# Patient Record
Sex: MALE | Race: WHITE | NOT HISPANIC OR LATINO | URBAN - METROPOLITAN AREA
[De-identification: names, ages, dates, MRNs, and addresses within clinical notes are randomized per-mention and may not be internally consistent; named-entity substitution may affect disease eponyms.]

---

## 2023-06-09 PROBLEM — J45.20 MILD INTERMITTENT ASTHMA (HHS-HCC): Status: ACTIVE | Noted: 2023-06-09

## 2023-06-09 PROBLEM — R05.9 COUGH: Status: ACTIVE | Noted: 2023-06-09

## 2023-06-09 PROBLEM — J30.81 ALLERGIC RHINITIS DUE TO ANIMAL DANDER: Status: ACTIVE | Noted: 2023-06-09

## 2023-06-09 PROBLEM — L90.6 STRIAE: Status: ACTIVE | Noted: 2023-06-09

## 2023-06-09 PROBLEM — J30.1 ALLERGIC RHINITIS DUE TO POLLEN: Status: ACTIVE | Noted: 2023-06-09

## 2023-06-09 PROBLEM — Z11.52 ENCOUNTER FOR SCREENING FOR COVID-19: Status: ACTIVE | Noted: 2023-06-09

## 2023-06-09 PROBLEM — J30.9 ALLERGIC RHINITIS: Status: ACTIVE | Noted: 2023-06-09

## 2023-06-09 PROBLEM — H10.10 CONJUNCTIVITIS, ALLERGIC: Status: ACTIVE | Noted: 2023-06-09

## 2023-06-09 PROBLEM — H10.9 CONJUNCTIVITIS: Status: ACTIVE | Noted: 2023-06-09

## 2023-06-09 PROBLEM — J45.909 ASTHMA (HHS-HCC): Status: ACTIVE | Noted: 2023-06-09

## 2023-06-09 PROBLEM — J32.9 RECURRENT SINUS INFECTIONS: Status: ACTIVE | Noted: 2023-06-09

## 2023-06-09 PROBLEM — L60.9 NAIL ABNORMALITIES: Status: ACTIVE | Noted: 2023-06-09

## 2023-06-09 PROBLEM — R29.3 POOR POSTURE: Status: ACTIVE | Noted: 2023-06-09

## 2023-06-09 PROBLEM — R00.2 HEART PALPITATIONS: Status: ACTIVE | Noted: 2023-06-09

## 2023-06-09 PROBLEM — R09.81 NASAL CONGESTION: Status: ACTIVE | Noted: 2023-06-09

## 2023-06-09 PROBLEM — R53.83 FATIGUE: Status: ACTIVE | Noted: 2023-06-09

## 2023-06-09 PROBLEM — N20.0 NEPHROLITHIASIS: Status: ACTIVE | Noted: 2023-06-09

## 2023-06-09 PROBLEM — D80.6: Status: ACTIVE | Noted: 2023-06-09

## 2023-06-09 RX ORDER — AZELASTINE HYDROCHLORIDE, FLUTICASONE PROPIONATE 137; 50 UG/1; UG/1
SPRAY, METERED NASAL EVERY 12 HOURS
COMMUNITY
Start: 2022-08-15

## 2023-06-09 RX ORDER — EPINEPHRINE 0.3 MG/.3ML
0.3 INJECTION SUBCUTANEOUS
COMMUNITY
Start: 2022-09-13

## 2023-06-09 RX ORDER — LORATADINE 10 MG/1
10 TABLET ORAL DAILY
COMMUNITY

## 2023-06-09 RX ORDER — OLOPATADINE HYDROCHLORIDE 2 MG/ML
SOLUTION/ DROPS OPHTHALMIC
COMMUNITY
Start: 2022-08-17

## 2023-06-09 RX ORDER — MINERAL OIL
ENEMA (ML) RECTAL
COMMUNITY
Start: 2022-08-17

## 2023-06-09 RX ORDER — LEVALBUTEROL TARTRATE 45 UG/1
AEROSOL, METERED ORAL
COMMUNITY
Start: 2014-08-26

## 2023-06-14 ENCOUNTER — APPOINTMENT (OUTPATIENT)
Dept: PEDIATRICS | Facility: CLINIC | Age: 18
End: 2023-06-14
Payer: COMMERCIAL

## 2023-08-04 RX ORDER — AMOXICILLIN AND CLAVULANATE POTASSIUM 875; 125 MG/1; MG/1
1 TABLET, FILM COATED ORAL EVERY 12 HOURS
COMMUNITY
Start: 2023-02-02

## 2023-08-04 RX ORDER — AMOXICILLIN 500 MG/1
CAPSULE ORAL
COMMUNITY
Start: 2023-04-03

## 2023-08-04 RX ORDER — AZELASTINE 1 MG/ML
SPRAY, METERED NASAL
COMMUNITY
Start: 2022-08-12

## 2023-08-10 ENCOUNTER — APPOINTMENT (OUTPATIENT)
Dept: PEDIATRICS | Facility: CLINIC | Age: 18
End: 2023-08-10
Payer: COMMERCIAL

## 2023-11-09 ENCOUNTER — TELEPHONE (OUTPATIENT)
Dept: ALLERGY | Facility: HOSPITAL | Age: 18
End: 2023-11-09
Payer: COMMERCIAL

## 2023-11-09 NOTE — TELEPHONE ENCOUNTER
Allergy clinic called to say vials were  and they need to dilute down, however they are not allowed to dilute the vials. Then called Uche who is coming home for thanksgiving break soon. The plan is for uche to bring his vials to his next appt with us, we will dilute down, administer the next dose and then send the vials back with him to continue his immunotherapy shots at school. Called his allergy clinic at school with details of this plan and left a call back number.

## 2023-11-22 ENCOUNTER — CLINICAL SUPPORT (OUTPATIENT)
Dept: ALLERGY | Facility: HOSPITAL | Age: 18
End: 2023-11-22
Payer: COMMERCIAL

## 2023-11-22 VITALS — TEMPERATURE: 97.6 F | SYSTOLIC BLOOD PRESSURE: 112 MMHG | DIASTOLIC BLOOD PRESSURE: 77 MMHG | HEART RATE: 64 BPM

## 2023-11-22 DIAGNOSIS — J30.81 ALLERGIC RHINITIS DUE TO ANIMAL DANDER: ICD-10-CM

## 2023-11-22 DIAGNOSIS — J30.1 SEASONAL ALLERGIC RHINITIS DUE TO POLLEN: ICD-10-CM

## 2023-11-22 DIAGNOSIS — H10.13 ALLERGIC CONJUNCTIVITIS OF BOTH EYES: ICD-10-CM

## 2023-11-22 PROCEDURE — 95117 IMMUNOTHERAPY INJECTIONS: CPT | Performed by: STUDENT IN AN ORGANIZED HEALTH CARE EDUCATION/TRAINING PROGRAM

## 2023-11-22 NOTE — PROGRESS NOTES
Jose Maria is here today for his regularly scheduled immunotherapy injection. Patient backed down to 0.35ml of 1:10 vials per protocol. Patient denies recent illness, delayed reaction after last injection, or asthma exacerbation. Patient reports taking antihistimine before today's visit. Lungs clear and equal bilaterally. Patient received his injection as recorded in flowsheet. Patient monitored for 30 minutes after injection and left the office at baseline state of health. Will continue allergy immunotherapy as planned. Patient instructed to call RN line if new symptoms or reaction from today's injection are noted.

## 2023-11-24 ENCOUNTER — OFFICE VISIT (OUTPATIENT)
Dept: PEDIATRICS | Facility: CLINIC | Age: 18
End: 2023-11-24
Payer: COMMERCIAL

## 2023-11-24 VITALS
HEART RATE: 71 BPM | DIASTOLIC BLOOD PRESSURE: 74 MMHG | SYSTOLIC BLOOD PRESSURE: 114 MMHG | WEIGHT: 153 LBS | BODY MASS INDEX: 20.28 KG/M2 | HEIGHT: 73 IN

## 2023-11-24 DIAGNOSIS — Z00.00 WELL ADULT HEALTH CHECK: Primary | ICD-10-CM

## 2023-11-24 PROCEDURE — 99395 PREV VISIT EST AGE 18-39: CPT | Performed by: PEDIATRICS

## 2023-11-24 NOTE — PROGRESS NOTES
"The patient is here alone today for routine health maintenance.   General Health: Overall in good health  Concerns today:   Significant PMHx: ALLERGIES-- SAW ALLERGIST AND IS GETTING SHOTS NOW.   Interim Hx:     Nutrition: COOKS FOR HIMSELF  Dental Care: Has a dental home. Performs regular dental hygiene.  Sleep: 8 HRS/ NIGHT.   Behavior/ Socialization: Appropriate peer relationships. Parent-child-sibling interactions are normal. Has supportive adult relationship(s). [facility] [employer] [employer].   Developmental: Does not receive educational accommodations. Social interaction is age-appropriate.   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  Work: WORKS AT [employer]  Activities: PLAYS [employer] ULTIMATE [employer]  Risk Assessment: Teen questionnaire completed and did not flag as abnormal.  Sex: DENIES  Drugs/Alcohol Use: OCC ETOH, WEED  Mental Health Assessment: Screening questionnaire for depression negative. Does not endorse thoughts of suicide or self-harm.  Safety: Uses safety belts in cars.     ROS:  Denies headaches, dizziness, syncope/ near syncope, stuffy/runny nose, sneezing, sore throat, coughing, chest pain, abnormal heart rate, abdominal pain, N/V/D, rashes, pain in extremities.      /74   Pulse 71   Ht 1.854 m (6' 1\")   Wt 69.4 kg (153 lb)   BMI 20.19 kg/m²   Growth percentiles: Facility age limit for growth %shannon is 20 years. Facility age limit for growth %shannon is 20 years.   Constitutional: Well-developed, well nourished, adequately hydrated. No acute distress.   Head/face: Normocephalic, atraumatic.  Eyes: Conjunctivae, sclerae, and lids WNL bilaterally. PERRL. EOMI.  ENT: No nasal discharge, TMs with normal color, landmarks, and reflectivity, without MEEs or retraction. EACs without edema, redness, or tenderness. Dentition intact. MMM. Tonsils WNL.  Neck: FROM, no significant cervical ROBE.  CV: Normal S1 and S2, RRR without M/R/G.  Pulm: No G/F/R. Easy, unlabored respirations without " W/R/R/C. Good air exchange all over.   Abd: Soft, NT/ND, no HSM, no masses. Normal BS and tympany on exam.  : Normal exam for stated age and gender.  Neuro: CN grossly intact. Normal gait. Reflexes 2+ and symmetric.  Psych: Mood and affect normal.     Healthy young adult!!  - Vaccines today: I RECOMMEND FLU AND COVID VACCINES THIS FALL.   - ALLERGIES: CONTINUE ALLERGY SHOTS PER ALLERGIST. CONTINUE ALLEGRA.   - See you next year.   Claudia Shankar MD

## 2023-11-24 NOTE — PATIENT INSTRUCTIONS
Healthy young adult!!  - Vaccines today: I RECOMMEND FLU AND COVID VACCINES THIS FALL.   - ALLERGIES: CONTINUE ALLERGY SHOTS PER ALLERGIST. CONTINUE ALLEGRA.   - See you next year.

## 2023-12-19 ENCOUNTER — CLINICAL SUPPORT (OUTPATIENT)
Dept: ALLERGY | Facility: HOSPITAL | Age: 18
End: 2023-12-19
Payer: COMMERCIAL

## 2023-12-19 VITALS
DIASTOLIC BLOOD PRESSURE: 81 MMHG | SYSTOLIC BLOOD PRESSURE: 134 MMHG | RESPIRATION RATE: 20 BRPM | TEMPERATURE: 97.8 F | HEART RATE: 70 BPM | OXYGEN SATURATION: 98 %

## 2023-12-19 DIAGNOSIS — J30.2 SEASONAL ALLERGIC RHINITIS, UNSPECIFIED TRIGGER: ICD-10-CM

## 2023-12-19 PROCEDURE — 95117 IMMUNOTHERAPY INJECTIONS: CPT | Performed by: STUDENT IN AN ORGANIZED HEALTH CARE EDUCATION/TRAINING PROGRAM

## 2023-12-19 NOTE — PROGRESS NOTES
Jose Maria here today for his regularly scheduled immunotherapy injection, per protocol. Patient in good state of health, received his shot as planned, as recorded in flowsheet for this encounter, waited for 30 minutes after his injection and left the office after that still at their baseline state of health. Will continue allergy immunotherapy as planned and call us in case any symptoms or reaction from today's injection are noted.

## 2024-01-02 ENCOUNTER — CLINICAL SUPPORT (OUTPATIENT)
Dept: ALLERGY | Facility: HOSPITAL | Age: 19
End: 2024-01-02
Payer: COMMERCIAL

## 2024-01-02 VITALS
HEART RATE: 83 BPM | WEIGHT: 154.4 LBS | BODY MASS INDEX: 19.2 KG/M2 | HEIGHT: 75 IN | SYSTOLIC BLOOD PRESSURE: 132 MMHG | DIASTOLIC BLOOD PRESSURE: 82 MMHG | TEMPERATURE: 97.9 F

## 2024-01-02 DIAGNOSIS — J30.2 SEASONAL ALLERGIC RHINITIS, UNSPECIFIED TRIGGER: ICD-10-CM

## 2024-01-02 DIAGNOSIS — J30.81 ALLERGIC RHINITIS DUE TO ANIMAL DANDER: ICD-10-CM

## 2024-01-02 DIAGNOSIS — J30.1 SEASONAL ALLERGIC RHINITIS DUE TO POLLEN: ICD-10-CM

## 2024-01-02 PROCEDURE — 95117 IMMUNOTHERAPY INJECTIONS: CPT | Performed by: STUDENT IN AN ORGANIZED HEALTH CARE EDUCATION/TRAINING PROGRAM

## 2024-01-09 ENCOUNTER — APPOINTMENT (OUTPATIENT)
Dept: ALLERGY | Facility: HOSPITAL | Age: 19
End: 2024-01-09
Payer: COMMERCIAL

## 2024-01-10 ENCOUNTER — APPOINTMENT (OUTPATIENT)
Dept: ALLERGY | Facility: HOSPITAL | Age: 19
End: 2024-01-10
Payer: COMMERCIAL

## 2024-01-16 ENCOUNTER — CLINICAL SUPPORT (OUTPATIENT)
Dept: ALLERGY | Facility: HOSPITAL | Age: 19
End: 2024-01-16
Payer: COMMERCIAL

## 2024-01-16 VITALS — TEMPERATURE: 97.6 F | SYSTOLIC BLOOD PRESSURE: 129 MMHG | DIASTOLIC BLOOD PRESSURE: 78 MMHG | HEART RATE: 60 BPM

## 2024-01-16 DIAGNOSIS — J30.2 SEASONAL ALLERGIC RHINITIS, UNSPECIFIED TRIGGER: ICD-10-CM

## 2024-01-16 PROCEDURE — 95117 IMMUNOTHERAPY INJECTIONS: CPT | Performed by: STUDENT IN AN ORGANIZED HEALTH CARE EDUCATION/TRAINING PROGRAM

## 2024-01-16 PROCEDURE — INJT2 ALLERGY IMMUNOTHERAPY 2+ INJECTIONS: Performed by: STUDENT IN AN ORGANIZED HEALTH CARE EDUCATION/TRAINING PROGRAM

## 2024-01-23 ENCOUNTER — CLINICAL SUPPORT (OUTPATIENT)
Dept: ALLERGY | Facility: HOSPITAL | Age: 19
End: 2024-01-23
Payer: COMMERCIAL

## 2024-01-23 VITALS
HEART RATE: 71 BPM | DIASTOLIC BLOOD PRESSURE: 82 MMHG | TEMPERATURE: 97.3 F | SYSTOLIC BLOOD PRESSURE: 135 MMHG | RESPIRATION RATE: 20 BRPM | WEIGHT: 152.78 LBS | BODY MASS INDEX: 19.61 KG/M2 | HEIGHT: 74 IN

## 2024-01-23 DIAGNOSIS — J30.2 SEASONAL ALLERGIC RHINITIS, UNSPECIFIED TRIGGER: ICD-10-CM

## 2024-01-23 PROCEDURE — 95117 IMMUNOTHERAPY INJECTIONS: CPT | Performed by: STUDENT IN AN ORGANIZED HEALTH CARE EDUCATION/TRAINING PROGRAM

## 2024-01-23 PROCEDURE — INJT2 ALLERGY IMMUNOTHERAPY 2+ INJECTIONS: Performed by: STUDENT IN AN ORGANIZED HEALTH CARE EDUCATION/TRAINING PROGRAM

## 2024-03-25 DIAGNOSIS — J30.81 ALLERGIC RHINITIS DUE TO ANIMAL DANDER: ICD-10-CM

## 2024-03-25 DIAGNOSIS — J30.1 SEASONAL ALLERGIC RHINITIS DUE TO POLLEN: ICD-10-CM

## 2024-03-25 DIAGNOSIS — J30.2 SEASONAL ALLERGIC RHINITIS, UNSPECIFIED TRIGGER: Primary | ICD-10-CM

## 2024-03-25 DIAGNOSIS — H10.13 ALLERGIC CONJUNCTIVITIS OF BOTH EYES: ICD-10-CM

## 2024-03-26 NOTE — PROGRESS NOTES
Order placed for subcutaneous immunotherapy refill vials, with prescription sent to  pharmacy.    Subcutaneous immunotherapy refill      Allergen immunotherapy vials compounded at  pharmacy. Maintenance vials and dilutions, if needed, compounded. I supervised compounding of vials.      Vial set A - Allergens: Tree pollens. Vial set B- Allergens: Tree, grass, and weed pollens. Vial set C - Tree pollens, cat, dog. Each vial set contains one 5 ml maintenance vial (1:1) with any dilutions (1:1000, 1:100, 1:10) as needed.      Vial Set A- 10 anticipated doses (maintenance set)  Vial Set B- 10 anticipated doses (maintenance set)   Vial Set C- 10 anticipated doses (maintenance set)       Total= 30 anticipated doses     Patient Name: Jose Maria Moctezuma  Medical Record Number: [id number]  Date of Service: 03/25/24  CPT Code: 65979   Billing Provider: Primo Andersen MD  Billing Location: [facility]  Units (quantity): 30  ICD-10 code: J30.2, J30.81, J30.1, H10.13

## 2024-03-27 ENCOUNTER — CLINICAL SUPPORT (OUTPATIENT)
Dept: ALLERGY | Facility: HOSPITAL | Age: 19
End: 2024-03-27
Payer: COMMERCIAL

## 2024-03-27 VITALS — HEART RATE: 66 BPM | SYSTOLIC BLOOD PRESSURE: 136 MMHG | TEMPERATURE: 97.5 F | DIASTOLIC BLOOD PRESSURE: 72 MMHG

## 2024-03-27 DIAGNOSIS — J30.81 ALLERGIC RHINITIS DUE TO ANIMAL DANDER: ICD-10-CM

## 2024-03-27 DIAGNOSIS — J30.2 SEASONAL ALLERGIC RHINITIS, UNSPECIFIED TRIGGER: ICD-10-CM

## 2024-03-27 DIAGNOSIS — J30.1 SEASONAL ALLERGIC RHINITIS DUE TO POLLEN: ICD-10-CM

## 2024-03-27 PROCEDURE — 95117 IMMUNOTHERAPY INJECTIONS: CPT | Performed by: STUDENT IN AN ORGANIZED HEALTH CARE EDUCATION/TRAINING PROGRAM

## 2024-03-27 PROCEDURE — INJT2 ALLERGY IMMUNOTHERAPY 2+ INJECTIONS: Performed by: STUDENT IN AN ORGANIZED HEALTH CARE EDUCATION/TRAINING PROGRAM

## 2024-03-27 ASSESSMENT — ENCOUNTER SYMPTOMS
LOSS OF SENSATION IN FEET: 0
OCCASIONAL FEELINGS OF UNSTEADINESS: 0
DEPRESSION: 0

## 2024-03-27 NOTE — PROGRESS NOTES
Jose Maria is here today for his regularly scheduled immunotherapy injection per protocol. Patient denies recent illness, delayed reaction after last injection, or asthma exacerbation. Patient reports taking antihistimine before today's visit. Lungs clear and equal bilaterally. Patient received his injection as recorded in flowsheet. Patient monitored for 30 minutes after injection and left the office at baseline state of health. Will continue allergy immunotherapy as planned. Patient instructed to call RN line if new symptoms or reaction from today's injection are noted.

## 2024-04-10 ENCOUNTER — TELEPHONE (OUTPATIENT)
Dept: ALLERGY | Facility: HOSPITAL | Age: 19
End: 2024-04-10
Payer: COMMERCIAL

## 2024-04-10 DIAGNOSIS — J30.1 SEASONAL ALLERGIC RHINITIS DUE TO POLLEN: ICD-10-CM

## 2024-04-10 DIAGNOSIS — H10.13 ALLERGIC CONJUNCTIVITIS OF BOTH EYES: ICD-10-CM

## 2024-04-10 DIAGNOSIS — J30.81 ALLERGIC RHINITIS DUE TO ANIMAL DANDER: Primary | ICD-10-CM

## 2024-04-10 PROCEDURE — 95165 ANTIGEN THERAPY SERVICES: CPT | Performed by: STUDENT IN AN ORGANIZED HEALTH CARE EDUCATION/TRAINING PROGRAM

## 2024-04-10 NOTE — TELEPHONE ENCOUNTER
Called to notify mom that vials ready to be picked up. She states that patient will  from [facility] on Friday.

## 2024-04-10 NOTE — TELEPHONE ENCOUNTER
Mom called to check status of new scit vials. States patient will be home from school this weekend and would like to know if they can  vials for him to take to school before then.

## 2024-04-10 NOTE — PROGRESS NOTES
Allergen immunotherapy vials compounded in [facility] pharmacy. Maintenance vials compounded for pollen and animal dander. Vial prescriptions are available in EMR    Vial A: pollen and normal saline diluent.   Vial B: pollen and normal saline diluent.   Vial C: pollen, animal dander and normal saline diluent.    Each vial contains one 5 ml maintenance vial (1:1) prepared with normal saline using aseptic technique.    Lisbet Tavarez, Pharm D

## 2024-04-26 ENCOUNTER — TELEPHONE (OUTPATIENT)
Dept: PEDIATRICS | Facility: CLINIC | Age: 19
End: 2024-04-26

## 2024-04-26 DIAGNOSIS — R29.3 POSTURE ABNORMALITY: Primary | ICD-10-CM

## 2024-04-26 NOTE — TELEPHONE ENCOUNTER
TT MOM  HIS POSTURE IS HUNCHED.  NOT A NEW PROBLEM.  REFER TO PT AT [facility]. -CW

## 2024-05-20 ENCOUNTER — TELEPHONE (OUTPATIENT)
Dept: ALLERGY | Facility: HOSPITAL | Age: 19
End: 2024-05-20

## 2024-05-22 ENCOUNTER — CLINICAL SUPPORT (OUTPATIENT)
Dept: ALLERGY | Facility: HOSPITAL | Age: 19
End: 2024-05-22
Payer: COMMERCIAL

## 2024-05-22 VITALS
HEART RATE: 52 BPM | OXYGEN SATURATION: 96 % | TEMPERATURE: 97.6 F | DIASTOLIC BLOOD PRESSURE: 73 MMHG | RESPIRATION RATE: 16 BRPM | SYSTOLIC BLOOD PRESSURE: 123 MMHG

## 2024-05-22 DIAGNOSIS — J30.2 SEASONAL ALLERGIC RHINITIS, UNSPECIFIED TRIGGER: ICD-10-CM

## 2024-05-22 PROCEDURE — 95117 IMMUNOTHERAPY INJECTIONS: CPT | Performed by: STUDENT IN AN ORGANIZED HEALTH CARE EDUCATION/TRAINING PROGRAM

## 2024-05-22 PROCEDURE — INJT2 ALLERGY IMMUNOTHERAPY 2+ INJECTIONS: Performed by: STUDENT IN AN ORGANIZED HEALTH CARE EDUCATION/TRAINING PROGRAM

## 2024-05-29 ENCOUNTER — CLINICAL SUPPORT (OUTPATIENT)
Dept: ALLERGY | Facility: HOSPITAL | Age: 19
End: 2024-05-29
Payer: COMMERCIAL

## 2024-05-29 VITALS — HEART RATE: 64 BPM | TEMPERATURE: 97.7 F | DIASTOLIC BLOOD PRESSURE: 77 MMHG | SYSTOLIC BLOOD PRESSURE: 122 MMHG

## 2024-05-29 DIAGNOSIS — J30.2 SEASONAL ALLERGIC RHINITIS, UNSPECIFIED TRIGGER: ICD-10-CM

## 2024-05-29 PROCEDURE — 95117 IMMUNOTHERAPY INJECTIONS: CPT | Performed by: STUDENT IN AN ORGANIZED HEALTH CARE EDUCATION/TRAINING PROGRAM

## 2024-05-29 PROCEDURE — INJT2 ALLERGY IMMUNOTHERAPY 2+ INJECTIONS: Performed by: STUDENT IN AN ORGANIZED HEALTH CARE EDUCATION/TRAINING PROGRAM

## 2024-06-04 ENCOUNTER — TELEPHONE (OUTPATIENT)
Dept: ALLERGY | Facility: HOSPITAL | Age: 19
End: 2024-06-04

## 2024-06-05 ENCOUNTER — CLINICAL SUPPORT (OUTPATIENT)
Dept: ALLERGY | Facility: HOSPITAL | Age: 19
End: 2024-06-05
Payer: COMMERCIAL

## 2024-06-05 VITALS
TEMPERATURE: 97.5 F | SYSTOLIC BLOOD PRESSURE: 127 MMHG | HEART RATE: 51 BPM | RESPIRATION RATE: 16 BRPM | OXYGEN SATURATION: 97 % | DIASTOLIC BLOOD PRESSURE: 80 MMHG

## 2024-06-05 DIAGNOSIS — J30.81 ALLERGIC RHINITIS DUE TO ANIMAL DANDER: ICD-10-CM

## 2024-06-05 DIAGNOSIS — J30.1 SEASONAL ALLERGIC RHINITIS DUE TO POLLEN: ICD-10-CM

## 2024-06-05 DIAGNOSIS — J30.2 SEASONAL ALLERGIC RHINITIS, UNSPECIFIED TRIGGER: ICD-10-CM

## 2024-06-05 PROCEDURE — 95117 IMMUNOTHERAPY INJECTIONS: CPT | Performed by: STUDENT IN AN ORGANIZED HEALTH CARE EDUCATION/TRAINING PROGRAM

## 2024-06-11 ENCOUNTER — APPOINTMENT (OUTPATIENT)
Dept: ALLERGY | Facility: HOSPITAL | Age: 19
End: 2024-06-11
Payer: COMMERCIAL

## 2024-06-25 ENCOUNTER — APPOINTMENT (OUTPATIENT)
Dept: ALLERGY | Facility: HOSPITAL | Age: 19
End: 2024-06-25
Payer: COMMERCIAL

## 2024-06-26 ENCOUNTER — CLINICAL SUPPORT (OUTPATIENT)
Dept: ALLERGY | Facility: HOSPITAL | Age: 19
End: 2024-06-26
Payer: COMMERCIAL

## 2024-06-26 VITALS
HEART RATE: 69 BPM | TEMPERATURE: 97.9 F | DIASTOLIC BLOOD PRESSURE: 76 MMHG | SYSTOLIC BLOOD PRESSURE: 121 MMHG | RESPIRATION RATE: 20 BRPM

## 2024-06-26 DIAGNOSIS — J30.2 SEASONAL ALLERGIC RHINITIS, UNSPECIFIED TRIGGER: ICD-10-CM

## 2024-06-26 PROCEDURE — 95117 IMMUNOTHERAPY INJECTIONS: CPT | Performed by: ALLERGY & IMMUNOLOGY

## 2024-06-26 PROCEDURE — INJT2 ALLERGY IMMUNOTHERAPY 2+ INJECTIONS: Performed by: ALLERGY & IMMUNOLOGY

## 2024-07-19 ENCOUNTER — APPOINTMENT (OUTPATIENT)
Dept: ALLERGY | Facility: CLINIC | Age: 19
End: 2024-07-19
Payer: COMMERCIAL

## 2024-07-19 VITALS
BODY MASS INDEX: 19.83 KG/M2 | SYSTOLIC BLOOD PRESSURE: 134 MMHG | HEIGHT: 73 IN | DIASTOLIC BLOOD PRESSURE: 80 MMHG | HEART RATE: 52 BPM | RESPIRATION RATE: 18 BRPM | WEIGHT: 149.6 LBS

## 2024-07-19 DIAGNOSIS — J30.2 SEASONAL ALLERGIC RHINITIS, UNSPECIFIED TRIGGER: ICD-10-CM

## 2024-07-19 PROCEDURE — 95117 IMMUNOTHERAPY INJECTIONS: CPT | Performed by: STUDENT IN AN ORGANIZED HEALTH CARE EDUCATION/TRAINING PROGRAM

## 2024-08-16 ENCOUNTER — APPOINTMENT (OUTPATIENT)
Dept: ALLERGY | Facility: CLINIC | Age: 19
End: 2024-08-16

## 2024-08-16 VITALS
HEIGHT: 73 IN | RESPIRATION RATE: 18 BRPM | WEIGHT: 147.93 LBS | HEART RATE: 57 BPM | SYSTOLIC BLOOD PRESSURE: 127 MMHG | BODY MASS INDEX: 19.61 KG/M2 | TEMPERATURE: 96.4 F | DIASTOLIC BLOOD PRESSURE: 81 MMHG

## 2024-08-16 DIAGNOSIS — H10.13 ALLERGIC CONJUNCTIVITIS OF BOTH EYES: ICD-10-CM

## 2024-08-16 DIAGNOSIS — J30.81 ALLERGIC RHINITIS DUE TO ANIMAL DANDER: ICD-10-CM

## 2024-08-16 DIAGNOSIS — J30.2 SEASONAL ALLERGIC RHINITIS, UNSPECIFIED TRIGGER: ICD-10-CM

## 2024-08-16 DIAGNOSIS — J30.1 SEASONAL ALLERGIC RHINITIS DUE TO POLLEN: ICD-10-CM

## 2024-08-16 PROCEDURE — 95117 IMMUNOTHERAPY INJECTIONS: CPT | Performed by: STUDENT IN AN ORGANIZED HEALTH CARE EDUCATION/TRAINING PROGRAM

## 2024-08-16 ASSESSMENT — ENCOUNTER SYMPTOMS
OCCASIONAL FEELINGS OF UNSTEADINESS: 0
DEPRESSION: 0
LOSS OF SENSATION IN FEET: 0

## 2024-08-19 DIAGNOSIS — J30.1 SEASONAL ALLERGIC RHINITIS DUE TO POLLEN: Primary | ICD-10-CM

## 2024-08-19 RX ORDER — EPINEPHRINE 0.3 MG/.3ML
0.3 INJECTION SUBCUTANEOUS ONCE AS NEEDED
Qty: 2 EACH | Refills: 0 | Status: SHIPPED | OUTPATIENT
Start: 2024-08-19

## 2024-08-30 ENCOUNTER — TELEPHONE (OUTPATIENT)
Dept: ALLERGY | Facility: CLINIC | Age: 19
End: 2024-08-30

## 2024-08-30 NOTE — TELEPHONE ENCOUNTER
Mom, called to inform us that the [facility] never received information for Jose Maria. Updated contact information and resent information.   Saved [facility] Consensus and Shot Record to media.

## 2024-10-14 ENCOUNTER — TELEPHONE (OUTPATIENT)
Dept: ALLERGY | Facility: CLINIC | Age: 19
End: 2024-10-14

## 2024-10-14 NOTE — TELEPHONE ENCOUNTER
Received call from nathaly mother that per the [facility] ([facility]) uche needs vial refills. Dr. Andersen notified. Called the [facility] and emailed so they can send shot sheet records. Called uche to notifiy him vials will take 4-6 weeks to remake. Plan to inject uche with new vials nov 29th while home on break and send vials with him at that time.

## 2024-10-15 ENCOUNTER — TELEPHONE (OUTPATIENT)
Dept: ALLERGY | Facility: HOSPITAL | Age: 19
End: 2024-10-15

## 2024-10-15 NOTE — TELEPHONE ENCOUNTER
Mom left VM checking when patient's next injection appointment is. RN left detailed VM with patient notifying of time, date, and location of appointment. Returned call to mom, notified of appointment details as well. Mom states after injection appointment patient plans on taking vials to school to resume therapy there.

## 2024-10-20 DIAGNOSIS — J30.1 SEASONAL ALLERGIC RHINITIS DUE TO POLLEN: ICD-10-CM

## 2024-10-20 DIAGNOSIS — H10.13 ALLERGIC CONJUNCTIVITIS OF BOTH EYES: ICD-10-CM

## 2024-10-20 DIAGNOSIS — J30.81 ALLERGIC RHINITIS DUE TO ANIMAL DANDER: ICD-10-CM

## 2024-10-20 DIAGNOSIS — J30.2 SEASONAL ALLERGIC RHINITIS, UNSPECIFIED TRIGGER: Primary | ICD-10-CM

## 2024-10-21 NOTE — PROGRESS NOTES
Order placed for subcutaneous immunotherapy refill vials, with prescription sent to  pharmacy.    Subcutaneous immunotherapy refill      Allergen immunotherapy vials compounded at  pharmacy. Maintenance vials and dilutions, if needed, compounded. I supervised compounding of vials.      Vial set A - Allergens: Tree pollens. Vial set B- Allergens: Tree, grass, and weed pollens. Vial set C - Tree pollens, cat, dog. Each vial set contains one 5 ml maintenance vial (1:1) with any dilutions (1:1000, 1:100, 1:10) as needed.      Vial Set A- 10 anticipated doses (maintenance set)  Vial Set B- 10 anticipated doses (maintenance set)   Vial Set C- 10 anticipated doses (maintenance set)       Total= 30 anticipated doses     Patient Name: Jose Maria Moctezuma  Medical Record Number: [id number]  Date of Service: 11/14/2024  CPT Code: 53450   Billing Provider: Primo Andersen MD  Billing Location: [facility] Allergy and Immunology  Units (quantity): 30  ICD-10 code: J30.2, J30.81, J30.1, H10.13

## 2024-11-14 DIAGNOSIS — H10.13 ALLERGIC CONJUNCTIVITIS OF BOTH EYES: ICD-10-CM

## 2024-11-14 DIAGNOSIS — J30.1 SEASONAL ALLERGIC RHINITIS DUE TO POLLEN: ICD-10-CM

## 2024-11-14 DIAGNOSIS — J30.81 ALLERGIC RHINITIS DUE TO ANIMAL DANDER: Primary | ICD-10-CM

## 2024-11-14 PROCEDURE — 95165 ANTIGEN THERAPY SERVICES: CPT | Performed by: STUDENT IN AN ORGANIZED HEALTH CARE EDUCATION/TRAINING PROGRAM

## 2024-11-14 NOTE — PROGRESS NOTES
Allergen immunotherapy vials compounded in [facility] pharmacy. Maintenance vials compounded for pollen and animal dander. Vials prescriptions are available in EMR    Vial A: pollen and normal saline diluent.   Vial B: pollen and normal saline diluent.   Vial C: pollen, animal dander and normal saline diluent.    Each vial contains one 5 ml maintenance vial (1:1) prepared with normal saline using aseptic technique.    Lisbet Tavarez, DagobertoD

## 2024-11-27 ENCOUNTER — APPOINTMENT (OUTPATIENT)
Dept: PEDIATRICS | Facility: CLINIC | Age: 19
End: 2024-11-27

## 2024-11-27 VITALS
SYSTOLIC BLOOD PRESSURE: 145 MMHG | HEIGHT: 73 IN | DIASTOLIC BLOOD PRESSURE: 78 MMHG | BODY MASS INDEX: 20.09 KG/M2 | HEART RATE: 66 BPM | WEIGHT: 151.6 LBS

## 2024-11-27 DIAGNOSIS — Z00.00 WELL ADULT EXAM: Primary | ICD-10-CM

## 2024-11-27 PROCEDURE — 90471 IMMUNIZATION ADMIN: CPT | Performed by: PEDIATRICS

## 2024-11-27 PROCEDURE — 99395 PREV VISIT EST AGE 18-39: CPT | Performed by: PEDIATRICS

## 2024-11-27 PROCEDURE — 90656 IIV3 VACC NO PRSV 0.5 ML IM: CPT | Performed by: PEDIATRICS

## 2024-11-27 PROCEDURE — 3008F BODY MASS INDEX DOCD: CPT | Performed by: PEDIATRICS

## 2024-11-27 NOTE — PROGRESS NOTES
"The patient is here alone today for routine health maintenance.   General Health: Overall in good health  Concerns today:   Significant PMHx: ALLERGIES-- TAKING SHOTS NOW AROUND EVERY MONTH (MAINT)  Interim Hx:     Nutrition: COOKS FOR HIMSELF.   Dental Care: Has a dental home. Performs regular dental hygiene.  Sleep: 7-9 HRS/NIGHT.   Behavior/ Socialization: Appropriate peer relationships. Parent-child-sibling interactions are normal. Has supportive adult relationship(s). [place] [facility] [employer].   Developmental: Does not receive educational accommodations. Social interaction is age-appropriate.   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 WANTS TO WORK IN [employer].   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  Sex:   Drugs/Alcohol Use:   Mental Health Assessment: Screening questionnaire for depression negative. Does not endorse thoughts of suicide or self-harm.  Safety: Uses safety belts in cars.     ROS:  Denies headaches, dizziness, syncope/ near syncope, stuffy/runny nose, sneezing, sore throat, coughing, chest pain, abnormal heart rate, abdominal pain, N/V/D, rashes, pain in extremities.      /78   Pulse 66   Ht 1.842 m (6' 0.5\")   Wt 68.8 kg (151 lb 9.6 oz)   BMI 20.28 kg/m²   Growth percentiles: Facility age limit for growth %shannon is 20 years. Facility age limit for growth %shannon is 20 years.   Constitutional: Well-developed, well nourished, adequately hydrated. No acute distress.   Head/face: Normocephalic, atraumatic.  Eyes: Conjunctivae, sclerae, and lids WNL bilaterally. PERRL. EOMI.  ENT: No nasal discharge, TMs with normal color, landmarks, and reflectivity, without MEEs or retraction. EACs without edema, redness, or tenderness. Dentition intact. MMM. Tonsils WNL.  Neck: FROM, no significant cervical ROBE.  CV: Normal S1 and S2, RRR without M/R/G.  Pulm: No G/F/R. Easy, " unlabored respirations without W/R/R/C. Good air exchange all over.   Abd: Soft, NT/ND, no HSM, no masses. Normal BS and tympany on exam.  : Normal exam for stated age and gender.  Neuro: CN grossly intact. Normal gait. Reflexes 2+ and symmetric.  Psych: Mood and affect normal.     Healthy young adult!!  - Vaccines today: FLU  - SCREENING LABS: CHOLESTEROL, ANEMIA, VITAMIN D, IRON, LIVE/KIDNEY FUNCTIONS.   - See you next year.   Claudia Shankar MD

## 2024-11-29 ENCOUNTER — APPOINTMENT (OUTPATIENT)
Dept: ALLERGY | Facility: CLINIC | Age: 19
End: 2024-11-29

## 2024-11-29 VITALS
SYSTOLIC BLOOD PRESSURE: 123 MMHG | WEIGHT: 152.2 LBS | DIASTOLIC BLOOD PRESSURE: 82 MMHG | RESPIRATION RATE: 20 BRPM | HEART RATE: 58 BPM | BODY MASS INDEX: 20.17 KG/M2 | HEIGHT: 73 IN

## 2024-11-29 DIAGNOSIS — J30.81 ALLERGIC RHINITIS DUE TO ANIMAL DANDER: ICD-10-CM

## 2024-11-29 DIAGNOSIS — J30.1 SEASONAL ALLERGIC RHINITIS DUE TO POLLEN: ICD-10-CM

## 2024-11-29 PROCEDURE — 95117 IMMUNOTHERAPY INJECTIONS: CPT | Performed by: STUDENT IN AN ORGANIZED HEALTH CARE EDUCATION/TRAINING PROGRAM

## 2024-12-20 ENCOUNTER — CLINICAL SUPPORT (OUTPATIENT)
Dept: ALLERGY | Facility: CLINIC | Age: 19
End: 2024-12-20
Payer: COMMERCIAL

## 2024-12-20 ENCOUNTER — APPOINTMENT (OUTPATIENT)
Dept: ALLERGY | Facility: CLINIC | Age: 19
End: 2024-12-20
Payer: COMMERCIAL

## 2024-12-20 VITALS
SYSTOLIC BLOOD PRESSURE: 141 MMHG | HEART RATE: 106 BPM | HEIGHT: 73 IN | WEIGHT: 148.2 LBS | RESPIRATION RATE: 20 BRPM | BODY MASS INDEX: 19.64 KG/M2 | DIASTOLIC BLOOD PRESSURE: 94 MMHG

## 2024-12-20 DIAGNOSIS — J30.2 SEASONAL ALLERGIC RHINITIS, UNSPECIFIED TRIGGER: ICD-10-CM

## 2024-12-20 RX ORDER — TOBRAMYCIN 3 MG/ML
SOLUTION/ DROPS OPHTHALMIC
COMMUNITY
Start: 2021-10-08

## 2025-01-16 ENCOUNTER — LAB (OUTPATIENT)
Dept: LAB | Facility: LAB | Age: 20
End: 2025-01-16
Payer: COMMERCIAL

## 2025-01-16 DIAGNOSIS — Z00.00 WELL ADULT EXAM: ICD-10-CM

## 2025-01-16 LAB
25(OH)D3 SERPL-MCNC: 26 NG/ML (ref 30–100)
ALBUMIN SERPL BCP-MCNC: 4.6 G/DL (ref 3.4–5)
ALP SERPL-CCNC: 49 U/L (ref 33–120)
ALT SERPL W P-5'-P-CCNC: 15 U/L (ref 10–52)
ANION GAP SERPL CALC-SCNC: 14 MMOL/L (ref 10–20)
AST SERPL W P-5'-P-CCNC: 14 U/L (ref 9–39)
BASOPHILS # BLD AUTO: 0.04 X10*3/UL (ref 0–0.1)
BASOPHILS NFR BLD AUTO: 1 %
BILIRUB SERPL-MCNC: 1.3 MG/DL (ref 0–1.2)
BUN SERPL-MCNC: 18 MG/DL (ref 6–23)
CALCIUM SERPL-MCNC: 9.7 MG/DL (ref 8.6–10.6)
CHLORIDE SERPL-SCNC: 103 MMOL/L (ref 98–107)
CHOLEST SERPL-MCNC: 147 MG/DL (ref 0–199)
CHOLESTEROL/HDL RATIO: 3.3
CO2 SERPL-SCNC: 28 MMOL/L (ref 21–32)
CREAT SERPL-MCNC: 0.93 MG/DL (ref 0.5–1.3)
EGFRCR SERPLBLD CKD-EPI 2021: >90 ML/MIN/1.73M*2
EOSINOPHIL # BLD AUTO: 0.13 X10*3/UL (ref 0–0.7)
EOSINOPHIL NFR BLD AUTO: 3.2 %
ERYTHROCYTE [DISTWIDTH] IN BLOOD BY AUTOMATED COUNT: 11.8 % (ref 11.5–14.5)
GLUCOSE SERPL-MCNC: 98 MG/DL (ref 74–99)
HCT VFR BLD AUTO: 47.3 % (ref 41–52)
HDLC SERPL-MCNC: 44.3 MG/DL
HGB BLD-MCNC: 16.4 G/DL (ref 13.5–17.5)
IMM GRANULOCYTES # BLD AUTO: 0.01 X10*3/UL (ref 0–0.7)
IMM GRANULOCYTES NFR BLD AUTO: 0.2 % (ref 0–0.9)
IRON SATN MFR SERPL: 25 % (ref 25–45)
IRON SERPL-MCNC: 84 UG/DL (ref 35–150)
LDLC SERPL CALC-MCNC: 85 MG/DL
LYMPHOCYTES # BLD AUTO: 1.16 X10*3/UL (ref 1.2–4.8)
LYMPHOCYTES NFR BLD AUTO: 28.6 %
MCH RBC QN AUTO: 30 PG (ref 26–34)
MCHC RBC AUTO-ENTMCNC: 34.7 G/DL (ref 32–36)
MCV RBC AUTO: 87 FL (ref 80–100)
MONOCYTES # BLD AUTO: 0.51 X10*3/UL (ref 0.1–1)
MONOCYTES NFR BLD AUTO: 12.6 %
NEUTROPHILS # BLD AUTO: 2.21 X10*3/UL (ref 1.2–7.7)
NEUTROPHILS NFR BLD AUTO: 54.4 %
NON HDL CHOLESTEROL: 103 MG/DL (ref 0–149)
NRBC BLD-RTO: 0 /100 WBCS (ref 0–0)
PLATELET # BLD AUTO: 174 X10*3/UL (ref 150–450)
POTASSIUM SERPL-SCNC: 3.9 MMOL/L (ref 3.5–5.3)
PROT SERPL-MCNC: 7.4 G/DL (ref 6.4–8.2)
RBC # BLD AUTO: 5.46 X10*6/UL (ref 4.5–5.9)
SODIUM SERPL-SCNC: 141 MMOL/L (ref 136–145)
TIBC SERPL-MCNC: 336 UG/DL (ref 240–445)
TRIGL SERPL-MCNC: 91 MG/DL (ref 0–114)
UIBC SERPL-MCNC: 252 UG/DL (ref 110–370)
VLDL: 18 MG/DL (ref 0–40)
WBC # BLD AUTO: 4.1 X10*3/UL (ref 4.4–11.3)

## 2025-01-16 PROCEDURE — 82306 VITAMIN D 25 HYDROXY: CPT

## 2025-01-16 PROCEDURE — 80053 COMPREHEN METABOLIC PANEL: CPT

## 2025-01-16 PROCEDURE — 83550 IRON BINDING TEST: CPT

## 2025-01-16 PROCEDURE — 80061 LIPID PANEL: CPT

## 2025-01-16 PROCEDURE — 85025 COMPLETE CBC W/AUTO DIFF WBC: CPT

## 2025-01-16 PROCEDURE — 83540 ASSAY OF IRON: CPT

## 2025-01-17 ENCOUNTER — APPOINTMENT (OUTPATIENT)
Dept: ALLERGY | Facility: CLINIC | Age: 20
End: 2025-01-17
Payer: COMMERCIAL

## 2025-01-17 VITALS
DIASTOLIC BLOOD PRESSURE: 75 MMHG | SYSTOLIC BLOOD PRESSURE: 122 MMHG | WEIGHT: 146.39 LBS | HEART RATE: 64 BPM | HEIGHT: 73 IN | BODY MASS INDEX: 19.4 KG/M2 | RESPIRATION RATE: 18 BRPM

## 2025-01-17 DIAGNOSIS — J30.81 ALLERGIC RHINITIS DUE TO ANIMAL DANDER: ICD-10-CM

## 2025-01-17 DIAGNOSIS — J30.2 SEASONAL ALLERGIC RHINITIS, UNSPECIFIED TRIGGER: ICD-10-CM

## 2025-01-17 DIAGNOSIS — J30.1 SEASONAL ALLERGIC RHINITIS DUE TO POLLEN: ICD-10-CM

## 2025-01-17 PROCEDURE — 95117 IMMUNOTHERAPY INJECTIONS: CPT | Performed by: STUDENT IN AN ORGANIZED HEALTH CARE EDUCATION/TRAINING PROGRAM

## 2025-01-17 ASSESSMENT — ENCOUNTER SYMPTOMS
OCCASIONAL FEELINGS OF UNSTEADINESS: 0
LOSS OF SENSATION IN FEET: 0
DEPRESSION: 0

## 2025-01-17 NOTE — PROGRESS NOTES
Jose Maria is here today for his regularly scheduled immunotherapy injection per protocol. Patient denies recent illness, delayed reaction after last injection, or asthma exacerbation. Patient reports taking antihistimine before today's visit. Lungs clear and equal bilaterally. Patient received his injection as recorded in flowsheet. Patient monitored for 30 minutes after injection and left the office at baseline state of health. Will continue allergy immunotherapy as planned. Patient instructed to call RN line if new symptoms or reaction from today's injection are noted. Patient continuing immunotherapy at school, already established with clinic. Patient sent with vials and copies of shot sheets.

## 2025-05-23 ENCOUNTER — CLINICAL SUPPORT (OUTPATIENT)
Dept: ALLERGY | Facility: CLINIC | Age: 20
End: 2025-05-23
Payer: COMMERCIAL

## 2025-05-23 VITALS — DIASTOLIC BLOOD PRESSURE: 77 MMHG | HEART RATE: 61 BPM | SYSTOLIC BLOOD PRESSURE: 137 MMHG

## 2025-05-23 DIAGNOSIS — J30.2 SEASONAL ALLERGIC RHINITIS, UNSPECIFIED TRIGGER: ICD-10-CM

## 2025-05-23 PROCEDURE — 95117 IMMUNOTHERAPY INJECTIONS: CPT | Performed by: STUDENT IN AN ORGANIZED HEALTH CARE EDUCATION/TRAINING PROGRAM

## 2025-06-20 ENCOUNTER — APPOINTMENT (OUTPATIENT)
Dept: ALLERGY | Facility: CLINIC | Age: 20
End: 2025-06-20
Payer: COMMERCIAL

## 2025-06-20 VITALS — OXYGEN SATURATION: 100 % | DIASTOLIC BLOOD PRESSURE: 73 MMHG | SYSTOLIC BLOOD PRESSURE: 123 MMHG

## 2025-06-20 DIAGNOSIS — J30.2 SEASONAL ALLERGIC RHINITIS, UNSPECIFIED TRIGGER: ICD-10-CM

## 2025-06-20 PROCEDURE — 95117 IMMUNOTHERAPY INJECTIONS: CPT | Performed by: STUDENT IN AN ORGANIZED HEALTH CARE EDUCATION/TRAINING PROGRAM

## 2025-07-18 ENCOUNTER — APPOINTMENT (OUTPATIENT)
Dept: ALLERGY | Facility: CLINIC | Age: 20
End: 2025-07-18
Payer: COMMERCIAL

## 2025-07-18 VITALS — SYSTOLIC BLOOD PRESSURE: 122 MMHG | OXYGEN SATURATION: 98 % | HEART RATE: 90 BPM | DIASTOLIC BLOOD PRESSURE: 80 MMHG

## 2025-07-18 DIAGNOSIS — J30.1 SEASONAL ALLERGIC RHINITIS DUE TO POLLEN: ICD-10-CM

## 2025-07-18 DIAGNOSIS — J30.81 ALLERGIC RHINITIS DUE TO ANIMAL DANDER: ICD-10-CM

## 2025-07-18 PROCEDURE — 95117 IMMUNOTHERAPY INJECTIONS: CPT | Performed by: STUDENT IN AN ORGANIZED HEALTH CARE EDUCATION/TRAINING PROGRAM

## 2025-07-18 ASSESSMENT — ENCOUNTER SYMPTOMS
LOSS OF SENSATION IN FEET: 0
OCCASIONAL FEELINGS OF UNSTEADINESS: 0
DEPRESSION: 0

## 2025-08-15 ENCOUNTER — APPOINTMENT (OUTPATIENT)
Dept: ALLERGY | Facility: CLINIC | Age: 20
End: 2025-08-15
Payer: COMMERCIAL

## 2025-08-15 VITALS
SYSTOLIC BLOOD PRESSURE: 128 MMHG | HEART RATE: 87 BPM | DIASTOLIC BLOOD PRESSURE: 79 MMHG | OXYGEN SATURATION: 98 % | RESPIRATION RATE: 20 BRPM

## 2025-08-15 DIAGNOSIS — H10.13 ALLERGIC CONJUNCTIVITIS OF BOTH EYES: ICD-10-CM

## 2025-08-15 DIAGNOSIS — J30.81 ALLERGIC RHINITIS DUE TO ANIMAL DANDER: Primary | ICD-10-CM

## 2025-08-15 DIAGNOSIS — J30.2 SEASONAL ALLERGIC RHINITIS, UNSPECIFIED TRIGGER: ICD-10-CM

## 2025-08-15 DIAGNOSIS — J30.81 ALLERGIC RHINITIS DUE TO ANIMAL DANDER: ICD-10-CM

## 2025-08-15 DIAGNOSIS — J30.1 SEASONAL ALLERGIC RHINITIS DUE TO POLLEN: ICD-10-CM

## 2025-08-15 PROCEDURE — 95117 IMMUNOTHERAPY INJECTIONS: CPT | Performed by: STUDENT IN AN ORGANIZED HEALTH CARE EDUCATION/TRAINING PROGRAM

## 2025-08-19 DIAGNOSIS — H10.13 ALLERGIC CONJUNCTIVITIS OF BOTH EYES: ICD-10-CM

## 2025-08-19 DIAGNOSIS — J30.81 ALLERGIC RHINITIS DUE TO ANIMAL DANDER: Primary | ICD-10-CM

## 2025-08-19 DIAGNOSIS — J30.1 SEASONAL ALLERGIC RHINITIS DUE TO POLLEN: ICD-10-CM

## 2025-08-19 PROCEDURE — 95165 ANTIGEN THERAPY SERVICES: CPT | Performed by: STUDENT IN AN ORGANIZED HEALTH CARE EDUCATION/TRAINING PROGRAM

## 2025-08-24 DIAGNOSIS — J30.1 SEASONAL ALLERGIC RHINITIS DUE TO POLLEN: ICD-10-CM

## 2025-08-24 RX ORDER — EPINEPHRINE 0.3 MG/.3ML
0.3 INJECTION SUBCUTANEOUS ONCE AS NEEDED
Qty: 2 EACH | Refills: 0 | Status: SHIPPED | OUTPATIENT
Start: 2025-08-24

## 2025-09-19 ENCOUNTER — APPOINTMENT (OUTPATIENT)
Dept: ALLERGY | Facility: CLINIC | Age: 20
End: 2025-09-19
Payer: COMMERCIAL

## 2025-09-26 ENCOUNTER — APPOINTMENT (OUTPATIENT)
Dept: ALLERGY | Facility: CLINIC | Age: 20
End: 2025-09-26
Payer: MEDICARE